# Patient Record
Sex: MALE | Race: WHITE | Employment: FULL TIME | URBAN - METROPOLITAN AREA
[De-identification: names, ages, dates, MRNs, and addresses within clinical notes are randomized per-mention and may not be internally consistent; named-entity substitution may affect disease eponyms.]

---

## 2017-05-03 ENCOUNTER — HOSPITAL ENCOUNTER (EMERGENCY)
Facility: CLINIC | Age: 47
Discharge: HOME | End: 2017-05-04
Attending: FAMILY MEDICINE | Admitting: FAMILY MEDICINE
Payer: COMMERCIAL

## 2017-05-03 ENCOUNTER — APPOINTMENT (OUTPATIENT)
Dept: GENERAL RADIOLOGY | Facility: CLINIC | Age: 47
End: 2017-05-03
Attending: FAMILY MEDICINE
Payer: COMMERCIAL

## 2017-05-03 DIAGNOSIS — R73.03 PREDIABETES: ICD-10-CM

## 2017-05-03 DIAGNOSIS — E78.5 HYPERLIPIDEMIA LDL GOAL <160: ICD-10-CM

## 2017-05-03 DIAGNOSIS — R07.9 ACUTE CHEST PAIN: ICD-10-CM

## 2017-05-03 DIAGNOSIS — R00.2 PALPITATIONS: ICD-10-CM

## 2017-05-03 LAB
ANION GAP SERPL CALCULATED.3IONS-SCNC: 11 MMOL/L (ref 3–14)
BASOPHILS # BLD AUTO: 0.1 10E9/L (ref 0–0.2)
BASOPHILS NFR BLD AUTO: 0.6 %
BUN SERPL-MCNC: 19 MG/DL (ref 7–30)
CALCIUM SERPL-MCNC: 9.2 MG/DL (ref 8.5–10.1)
CHLORIDE SERPL-SCNC: 104 MMOL/L (ref 94–109)
CO2 SERPL-SCNC: 29 MMOL/L (ref 20–32)
CREAT SERPL-MCNC: 0.96 MG/DL (ref 0.66–1.25)
D DIMER PPP FEU-MCNC: 0.3 UG/ML FEU (ref 0–0.5)
DIFFERENTIAL METHOD BLD: NORMAL
EOSINOPHIL # BLD AUTO: 0.5 10E9/L (ref 0–0.7)
EOSINOPHIL NFR BLD AUTO: 4.8 %
ERYTHROCYTE [DISTWIDTH] IN BLOOD BY AUTOMATED COUNT: 14.6 % (ref 10–15)
GFR SERPL CREATININE-BSD FRML MDRD: 84 ML/MIN/1.7M2
GLUCOSE SERPL-MCNC: 131 MG/DL (ref 70–99)
HBA1C MFR BLD: 5.4 % (ref 4.3–6)
HCT VFR BLD AUTO: 46 % (ref 40–53)
HGB BLD-MCNC: 14.9 G/DL (ref 13.3–17.7)
IMM GRANULOCYTES # BLD: 0 10E9/L (ref 0–0.4)
IMM GRANULOCYTES NFR BLD: 0.2 %
LYMPHOCYTES # BLD AUTO: 3.5 10E9/L (ref 0.8–5.3)
LYMPHOCYTES NFR BLD AUTO: 36.6 %
MCH RBC QN AUTO: 28.9 PG (ref 26.5–33)
MCHC RBC AUTO-ENTMCNC: 32.4 G/DL (ref 31.5–36.5)
MCV RBC AUTO: 89 FL (ref 78–100)
MONOCYTES # BLD AUTO: 0.6 10E9/L (ref 0–1.3)
MONOCYTES NFR BLD AUTO: 6.6 %
NEUTROPHILS # BLD AUTO: 5 10E9/L (ref 1.6–8.3)
NEUTROPHILS NFR BLD AUTO: 51.2 %
PLATELET # BLD AUTO: 234 10E9/L (ref 150–450)
POTASSIUM SERPL-SCNC: 4 MMOL/L (ref 3.4–5.3)
RBC # BLD AUTO: 5.16 10E12/L (ref 4.4–5.9)
SODIUM SERPL-SCNC: 144 MMOL/L (ref 133–144)
TROPONIN I SERPL-MCNC: NORMAL UG/L (ref 0–0.04)
WBC # BLD AUTO: 9.7 10E9/L (ref 4–11)

## 2017-05-03 PROCEDURE — 85379 FIBRIN DEGRADATION QUANT: CPT | Performed by: FAMILY MEDICINE

## 2017-05-03 PROCEDURE — 93005 ELECTROCARDIOGRAM TRACING: CPT | Mod: 76 | Performed by: FAMILY MEDICINE

## 2017-05-03 PROCEDURE — 93005 ELECTROCARDIOGRAM TRACING: CPT | Performed by: FAMILY MEDICINE

## 2017-05-03 PROCEDURE — 85025 COMPLETE CBC W/AUTO DIFF WBC: CPT | Performed by: FAMILY MEDICINE

## 2017-05-03 PROCEDURE — 93010 ELECTROCARDIOGRAM REPORT: CPT | Mod: Z6 | Performed by: FAMILY MEDICINE

## 2017-05-03 PROCEDURE — 99285 EMERGENCY DEPT VISIT HI MDM: CPT | Mod: 25 | Performed by: FAMILY MEDICINE

## 2017-05-03 PROCEDURE — 84484 ASSAY OF TROPONIN QUANT: CPT | Performed by: FAMILY MEDICINE

## 2017-05-03 PROCEDURE — 80048 BASIC METABOLIC PNL TOTAL CA: CPT | Performed by: FAMILY MEDICINE

## 2017-05-03 PROCEDURE — 25000132 ZZH RX MED GY IP 250 OP 250 PS 637: Performed by: FAMILY MEDICINE

## 2017-05-03 PROCEDURE — 71020 XR CHEST 2 VW: CPT | Mod: TC

## 2017-05-03 PROCEDURE — 93010 ELECTROCARDIOGRAM REPORT: CPT | Mod: 76 | Performed by: FAMILY MEDICINE

## 2017-05-03 PROCEDURE — 83036 HEMOGLOBIN GLYCOSYLATED A1C: CPT | Performed by: FAMILY MEDICINE

## 2017-05-03 RX ORDER — LIDOCAINE 40 MG/G
CREAM TOPICAL
Status: DISCONTINUED | OUTPATIENT
Start: 2017-05-03 | End: 2017-05-04 | Stop reason: HOSPADM

## 2017-05-03 RX ORDER — ASPIRIN 81 MG/1
324 TABLET, CHEWABLE ORAL ONCE
Status: COMPLETED | OUTPATIENT
Start: 2017-05-03 | End: 2017-05-03

## 2017-05-03 RX ADMIN — ASPIRIN 81 MG 324 MG: 81 TABLET ORAL at 22:34

## 2017-05-03 RX ADMIN — NITROGLYCERIN 15 MG: 20 OINTMENT TOPICAL at 22:35

## 2017-05-03 ASSESSMENT — ENCOUNTER SYMPTOMS
BLOOD IN STOOL: 0
WOUND: 0
FEVER: 0
MUSCULOSKELETAL NEGATIVE: 1
DIARRHEA: 0
VOMITING: 0
APPETITE CHANGE: 0
LIGHT-HEADEDNESS: 1
WHEEZING: 0
RECTAL PAIN: 0
MYALGIAS: 0
NUMBNESS: 0
CHILLS: 0
HEMATOLOGIC/LYMPHATIC NEGATIVE: 1
NAUSEA: 0
FATIGUE: 1
UNEXPECTED WEIGHT CHANGE: 0
ABDOMINAL PAIN: 0
COLOR CHANGE: 0
DIAPHORESIS: 1
COUGH: 0
SEIZURES: 0
NERVOUS/ANXIOUS: 1
CHEST TIGHTNESS: 1
BACK PAIN: 0
SHORTNESS OF BREATH: 0
EYES NEGATIVE: 1
ACTIVITY CHANGE: 1
GASTROINTESTINAL NEGATIVE: 1

## 2017-05-03 NOTE — ED AVS SNAPSHOT
[facility]    [address]     [place] [address]    Phone:  [phone]    Fax:  [phone]                                       Mell Lawson   MRN: [id number]    Department:  [facility]   Date of Visit:  5/3/2017           Patient Information     Date Of Birth          1970        Your diagnoses for this visit were:     Acute chest pain     Palpitations     Prediabetes     Hyperlipidemia LDL goal <160        You were seen by Eric Cuadra DO.      Follow-up Information     Follow up with Erica Toney, RN.    Specialty:  Family Practice    Contact information:    [facility]  [address]  [facility] [address]  [phone]          Follow up with [facility].    Specialty:  EMERGENCY MEDICINE    Why:  If symptoms worsen    Contact information:    [address]   [place] [address] [phone]    Additional information:    From [address]: Exit at [address] on south side of [place]. Turn right on [address]. Turn left at stoplight on [address]. [facility] will be in view two blocks ahead        Discharge Instructions       Please read and follow the handout(s) instructions. Return, if needed, for increased or worsening symptoms and as directed by the handout(s).    Please contact the cardiology stress testing department later today to schedule a cardiac evaluation as we discussed. Call [phone] to schedule.    Electronically signed, Eric Cuadra DO      Discharge References/Attachments     CHEST PAIN, UNCERTAIN CAUSE (ENGLISH)    CHEST PAIN, NONCARDIAC  (ENGLISH)    PALPITATIONS (ENGLISH)      24 Hour Appointment Hotline       To make an appointment at any [facility], call [phone] ([phone]). If you don't have a family doctor or clinic, we will help you find one. [place] clinics are conveniently located to serve the  needs of you and your family.          ED Discharge Orders     NM Exercise stress test       The type of stress to be performed (pharmacologic or physical) will be at the discretion of the supervising physician as per department protocol. Please send results to the patient's primary care physician                     Review of your medicines      Our records show that you are taking the medicines listed below. If these are incorrect, please call your family doctor or clinic.        Dose / Directions Last dose taken    EPINEPHrine 0.3 MG/0.3ML injection   Commonly known as:  EPIPEN 2-PATRIA   Dose:  0.3 mg   Quantity:  1 each        Inject 0.3 mLs (0.3 mg) into the muscle once as needed for anaphylaxis   Refills:  0          STOP taking        Dose Reason for stopping Comments    ADVIL PM PO              IBUPROFEN PO                      Procedures and tests performed during your visit     Procedure/Test Number of Times Performed    Basic metabolic panel 1    CBC with platelets differential 1    Cardiac Continuous Monitoring 1    D dimer quantitative 1    EKG 12 lead 1    EKG 12-lead, tracing only 1    Hemoglobin A1c 1    Peripheral IV: Standard 1    Pulse oximetry nursing 1    Troponin I 2    XR Chest 2 Views 1      Orders Needing Specimen Collection     None      Pending Results     No orders found for last 3 day(s).            Pending Culture Results     No orders found for last 3 day(s).            Pending Results Instructions     If you had any lab results that were not finalized at the time of your Discharge, you can call the ED Lab Result RN at [phone]. You will be contacted by this team for any positive Lab results or changes in treatment. The nurses are available 7 days a week from 10A to 6:30P.  You can leave a message 24 hours per day and they will return your call.        Thank you for choosing [facility]       Thank you for choosing [facility] for your care. Our goal is always to provide you with excellent  "care. Hearing back from our patients is one way we can continue to improve our services. Please take a few minutes to complete the written survey that you may receive in the mail after you visit with us. Thank you!        Recommend Information     Recommend lets you send messages to your doctor, view your test results, renew your prescriptions, schedule appointments and more. To sign up, go to [email or web address] . Click on \"Log in\" on the left side of the screen, which will take you to the Welcome page. Then click on \"Sign up Now\" on the right side of the page.     You will be asked to enter the access code listed below, as well as some personal information. Please follow the directions to create your username and password.     Your access code is: [email or web address]  Expires: 2017  1:32 AM     Your access code will  in 90 days. If you need help or a new code, please call your [facility] clinic or [phone].        Care EveryWhere ID     This is your Care EveryWhere ID. This could be used by other organizations to access your [facility] medical records  [id number]        After Visit Summary       This is your record. Keep this with you and show to your community pharmacist(s) and doctor(s) at your next visit.                  "

## 2017-05-03 NOTE — ED AVS SNAPSHOT
[facility] Emergency Department    [address] [place] [address]    Phone:  [phone]    Fax:  [phone]                                       Mell Lawson   MRN: [id number]    Department:  [facility] Emergency Department   Date of Visit:  5/3/2017           After Visit Summary Signature Page     I have received my discharge instructions, and my questions have been answered. I have discussed any challenges I see with this plan with the nurse or doctor.    ..........................................................................................................................................  Patient/Patient Representative Signature      ..........................................................................................................................................  Patient Representative Print Name and Relationship to Patient    ..................................................               ................................................  Date                                            Time    ..........................................................................................................................................  Reviewed by Signature/Title    ...................................................              ..............................................  Date                                                            Time

## 2017-05-04 VITALS
SYSTOLIC BLOOD PRESSURE: 136 MMHG | WEIGHT: 315 LBS | OXYGEN SATURATION: 96 % | DIASTOLIC BLOOD PRESSURE: 70 MMHG | TEMPERATURE: 97.8 F | RESPIRATION RATE: 18 BRPM | BODY MASS INDEX: 46.22 KG/M2

## 2017-05-04 LAB — TROPONIN I SERPL-MCNC: NORMAL UG/L (ref 0–0.04)

## 2017-05-04 PROCEDURE — 84484 ASSAY OF TROPONIN QUANT: CPT | Performed by: FAMILY MEDICINE

## 2017-05-04 NOTE — DISCHARGE INSTRUCTIONS
Please read and follow the handout(s) instructions. Return, if needed, for increased or worsening symptoms and as directed by the handout(s).    Please contact the cardiology stress testing department later today to schedule a cardiac evaluation as we discussed. Call [phone] to schedule.    Electronically signed, Eric Cuadra DO

## 2017-05-04 NOTE — ED PROVIDER NOTES
History     Chief Complaint   Patient presents with     Chest Pain     HPI  Mell Lawson is a 46 year old male who presents to the emergency room with his significant other secondary to concerns of chest pressure that started about one hour ago with some radiation into his left arm.  His significant other states that he's been dealing with these symptoms on and off for the last 1 year.  He was supposed to undergo a cardiac stress test about one year ago but had a reaction to the medication they used to inject prior to his stress test and he became afraid of returning for any additional testing after that reaction.  Patient states that he is on no medications and is otherwise healthy except for being a little overweight.  He states his blood pressure usually runs about 130/80.  He states that his family history is positive for diabetes.  He states he quit smoking 4 years ago.  He states that he doesn't have any pain at the moment but more just of a pressure to his anterior chest.  He no longer has any left arm pains.  He denies being short of breath but his significant other states that he's been increasingly winded with any activity over the last 1 year.    I have reviewed the Medications, Allergies, Past Medical and Surgical History, and Social History in the Epic system.  Patient Active Problem List   Diagnosis     Hyperlipidemia LDL goal <160     Elevated blood pressure reading without diagnosis of hypertension     Polyarthritis     Muscle pain     Back stiffness     Neck stiffness     Obesity     Prediabetes     ELIZABETH (obstructive sleep apnea)       Past Medical History:   Diagnosis Date     ELIZABETH (obstructive sleep apnea)         Past Surgical History:   Procedure Laterality Date     ARTHROSCOPY KNEE WITH MENISCAL REPAIR      Right knee, multiple other previous surgeries as well.     HERNIORRHAPHY UMBILICAL N/A 1/29/2015    Procedure: HERNIORRHAPHY UMBILICAL;  Surgeon: Maxime Boyce MD;  Location:  OR        Family History   Problem Relation Age of Onset     DIABETES Father      house first kidney transplant     Alzheimer Disease Maternal Grandmother      Arthritis Maternal Grandfather      DIABETES Paternal Grandfather      C.A.D. Paternal Grandfather        Social History     Social History     Marital status:      Spouse name: N/A     Number of children: N/A     Years of education: N/A     Occupational History     Not on file.     Social History Main Topics     Smoking status: Former Smoker     Packs/day: 1.00     Quit date: 5/28/2013     Smokeless tobacco: Never Used     Alcohol use Yes      Comment: occ     Drug use: No     Sexual activity: Yes     Partners: Female     Birth control/ protection: Surgical     Other Topics Concern     Parent/Sibling W/ Cabg, Mi Or Angioplasty Before 65f 55m? No     Social History Narrative       Current Outpatient Rx   Medication Sig Dispense Refill     EPINEPHrine (EPIPEN 2-PATRIA) 0.3 MG/0.3ML injection Inject 0.3 mLs (0.3 mg) into the muscle once as needed for anaphylaxis 1 each 0       Allergies   Allergen Reactions     Definity      Flushing      Seafood Swelling     Only to shell fish       Immunization History   Administered Date(s) Administered     Pneumococcal 23 valent 11/27/2007     TD (ADULT, 7+) 12/02/2014     Tdap (Adacel,Boostrix) 11/27/2007       Review of Systems   Constitutional: Positive for activity change, diaphoresis and fatigue. Negative for appetite change, chills, fever and unexpected weight change.   HENT: Negative.    Eyes: Negative.    Respiratory: Positive for chest tightness. Negative for cough, shortness of breath and wheezing.    Gastrointestinal: Negative.  Negative for abdominal pain, blood in stool, diarrhea, nausea, rectal pain and vomiting.   Genitourinary: Negative.    Musculoskeletal: Negative.  Negative for back pain and myalgias.   Skin: Negative for color change, rash and wound.   Neurological: Positive for light-headedness (patient  admits that he's had occasional episodes of feeling lightheaded over the last 1 year). Negative for seizures and numbness.   Hematological: Negative.    Psychiatric/Behavioral: The patient is nervous/anxious (his significant other states he is a history for anxiety at times.).    All other systems reviewed and are negative.      Physical Exam   BP: (!) 162/92  Heart Rate: 83  Temp: 97.8  F (36.6  C)  Resp: 18  Weight: (!) 163.3 kg (360 lb)  SpO2: 97 %  Physical Exam   Constitutional: He is oriented to person, place, and time. He appears well-developed and well-nourished. He appears distressed (Anxious).   Morbidly obese 46-year-old male who appears to be somewhat flushed and diaphoretic.   HENT:   Head: Normocephalic and atraumatic.   Right Ear: External ear normal.   Nose: Nose normal.   Mouth/Throat: Oropharynx is clear and moist.   Eyes: Conjunctivae and EOM are normal. Pupils are equal, round, and reactive to light.   Neck: Normal range of motion. Neck supple.   Patient has multiple skin tags surrounding his neck making me concerned for the possibility of insulin resistance as a causative reason for the skin tags.   Cardiovascular: Normal rate, normal heart sounds and intact distal pulses.  Exam reveals no gallop and no friction rub.    No murmur heard.  Pulmonary/Chest: Effort normal. No respiratory distress. He has no wheezes. He has no rales. He exhibits no tenderness.   Abdominal: Soft. There is no tenderness.   Musculoskeletal: Normal range of motion.   Neurological: He is alert and oriented to person, place, and time.   Skin: Skin is warm.   Patient has a flushed appearance to his face and anterior chest.   Psychiatric: His speech is normal and behavior is normal. Thought content normal. His mood appears anxious. Cognition and memory are normal.   Nursing note and vitals reviewed.      ED Course     ED Course     Procedures             EKG Interpretation:      Interpreted by Eric Cuadra  Time  reviewed: 22:08  Symptoms at time of EKG: Chest pressure   Rhythm: normal sinus   Rate: Normal  Axis: Normal  Ectopy: none  Conduction: normal  ST Segments/ T Waves: No ST-T wave changes and No acute ischemic changes  Q Waves: nonspecific  Comparison to prior: Unchanged from 2015    Clinical Impression: no acute changes         EKG Interpretation: #2     Interpreted by Eric Cuadra  Time reviewed:00:24   Symptoms at time of EKG: None   Comparison to prior: Unchanged from above    Clinical Impression: normal EKG      Critical Care time:  none            Results for orders placed or performed during the hospital encounter of 05/03/17 (from the past 48 hour(s))   CBC with platelets differential   Result Value Ref Range    WBC 9.7 4.0 - 11.0 10e9/L    RBC Count 5.16 4.4 - 5.9 10e12/L    Hemoglobin 14.9 13.3 - 17.7 g/dL    Hematocrit 46.0 40.0 - 53.0 %    MCV 89 78 - 100 fl    MCH 28.9 26.5 - 33.0 pg    MCHC 32.4 31.5 - 36.5 g/dL    RDW 14.6 10.0 - 15.0 %    Platelet Count 234 150 - 450 10e9/L    Diff Method Automated Method     % Neutrophils 51.2 %    % Lymphocytes 36.6 %    % Monocytes 6.6 %    % Eosinophils 4.8 %    % Basophils 0.6 %    % Immature Granulocytes 0.2 %    Absolute Neutrophil 5.0 1.6 - 8.3 10e9/L    Absolute Lymphocytes 3.5 0.8 - 5.3 10e9/L    Absolute Monocytes 0.6 0.0 - 1.3 10e9/L    Absolute Eosinophils 0.5 0.0 - 0.7 10e9/L    Absolute Basophils 0.1 0.0 - 0.2 10e9/L    Abs Immature Granulocytes 0.0 0 - 0.4 10e9/L   Basic metabolic panel   Result Value Ref Range    Sodium 144 133 - 144 mmol/L    Potassium 4.0 3.4 - 5.3 mmol/L    Chloride 104 94 - 109 mmol/L    Carbon Dioxide 29 20 - 32 mmol/L    Anion Gap 11 3 - 14 mmol/L    Glucose 131 (H) 70 - 99 mg/dL    Urea Nitrogen 19 7 - 30 mg/dL    Creatinine 0.96 0.66 - 1.25 mg/dL    GFR Estimate 84 >60 mL/min/1.7m2    GFR Estimate If Black >90   GFR Calc   >60 mL/min/1.7m2    Calcium 9.2 8.5 - 10.1 mg/dL   Troponin I   Result Value  Ref Range    Troponin I ES  0.000 - 0.045 ug/L     <0.015  The 99th percentile for upper reference range is 0.045 ug/L.  Troponin values in   the range of 0.045 - 0.120 ug/L may be associated with risks of adverse   clinical events.     Hemoglobin A1c   Result Value Ref Range    Hemoglobin A1C 5.4 4.3 - 6.0 %   D dimer quantitative   Result Value Ref Range    D Dimer 0.3 0.0 - 0.50 ug/ml FEU   XR Chest 2 Views    Narrative    XR CHEST 2 VW   5/3/2017 11:22 PM     INDICATION: Chest pain.    COMPARISON: 1/3/2013.      Impression    IMPRESSION: No infiltrates or other acute findings. Heart size is  within normal limits. Mild torsion aorta. No pneumothorax.    JAMAR DE DIOS MD   Troponin I   Result Value Ref Range    Troponin I ES  0.000 - 0.045 ug/L     <0.015  The 99th percentile for upper reference range is 0.045 ug/L.  Troponin values in   the range of 0.045 - 0.120 ug/L may be associated with risks of adverse   clinical events.       Medications ordered to be administered in the ER:    Medications   aspirin chewable tablet 324 mg (324 mg Oral Given 5/3/17 2234)   nitroglycerin (NITRO-BID) 2 % ointment 15 mg (15 mg Transdermal Given 5/3/17 2235)       Assessments & Plan (with Medical Decision Making)    HEART Score  Background  Calculates the overall risk of adverse event in patient's presenting with chest pain.  Based on 5 criteria (each assigned 0-2 points) including suspiciousness of history, EKG, age, risk factors and troponin.    Data  46 year old male  has Hyperlipidemia LDL goal <160; Elevated blood pressure reading without diagnosis of hypertension; Polyarthritis; Muscle pain; Back stiffness; Neck stiffness; Obesity; Prediabetes; and ELIZABETH (obstructive sleep apnea) on his problem list.   reports that he quit smoking about 3 years ago. He smoked 1.00 pack per day. He has never used smokeless tobacco.  family history includes Alzheimer Disease in his maternal grandmother; Arthritis in his maternal  grandfather; C.A.D. in his paternal grandfather; DIABETES in his father and paternal grandfather.  Lab Results   Component Value Date    TROPI  05/04/2017     <0.015  The 99th percentile for upper reference range is 0.045 ug/L.  Troponin values in   the range of 0.045 - 0.120 ug/L may be associated with risks of adverse   clinical events.       Criteria   0-2 points for each of 5 items (maximum of 10 points):  Score 1- History moderately suspicious for coronary syndrome  Score 0- EKG Normal  Score 1- Age 45 to 65 years old  Score 1- One to 2 risk factors for atherosclerotic disease  Score 0- Within normal limits for troponin levels  Interpretation  Risk of adverse outcome  Heart Score: 3  Total Score 0-3- Adverse Outcome Risk 2.5% - Supports early discharge with appropriate follow-up.      Procedural male to the emergency room with concerns of chest pain and pressure with radiation to left arm that started at approximately 9:00 this evening.  Patient with risk factors of obesity and prediabetes but states he has not had a history for hypertension and quit smoking years ago.  Exam findings as noted above.  Patient had repeat EKG and cardiac enzymes performed 3 hours after onset of his symptoms which again proved unremarkable for suggestion of ischemic heart disease or injury.  Patient was feeling improved and decision was made to discharge.  Patient did agree to really start his previous cardiac evaluation that was initiated in 2015 but stopped after he had the adverse reaction to the cardiac stress test.  If future order was placed for a cardiac nuclear study and asked that the study results be sent to his primary care provider..  Encouraged patient to make an appointment for follow-up with his primary care provider as well.  He was given both verbal and written instructions about chest pain and the signs and symptoms of concern and when to return to the ER should he have any symptoms.       I have reviewed the  nursing notes.    I have reviewed the findings, diagnosis, plan and need for follow up with the patient.    Final diagnoses:   Acute chest pain   Palpitations   Prediabetes   Hyperlipidemia LDL goal <160       5/3/2017   [facility] EMERGENCY DEPARTMENT     Eric Cuadra DO  05/04/17 2009

## 2017-05-04 NOTE — ED NOTES
LATE ENTRY DUE TO PT CARE:    Pt presented with chest pain into his left arm.  Pt placed on cardiac monitor, oxymetry, and blood pressure monitors.  IV placed and labs drawn.  Nitro past applied.  Pt progressed to no pain.  Wife at bedside.  At discharge pt denied any pain.

## 2020-10-20 ENCOUNTER — APPOINTMENT (OUTPATIENT)
Dept: GENERAL RADIOLOGY | Facility: CLINIC | Age: 50
End: 2020-10-20
Attending: FAMILY MEDICINE
Payer: COMMERCIAL

## 2020-10-20 ENCOUNTER — HOSPITAL ENCOUNTER (EMERGENCY)
Facility: CLINIC | Age: 50
Discharge: HOME | End: 2020-10-20
Attending: FAMILY MEDICINE | Admitting: FAMILY MEDICINE
Payer: COMMERCIAL

## 2020-10-20 VITALS
TEMPERATURE: 97.8 F | HEART RATE: 72 BPM | SYSTOLIC BLOOD PRESSURE: 156 MMHG | HEIGHT: 74 IN | BODY MASS INDEX: 40.43 KG/M2 | DIASTOLIC BLOOD PRESSURE: 99 MMHG | WEIGHT: 315 LBS | RESPIRATION RATE: 20 BRPM | OXYGEN SATURATION: 98 %

## 2020-10-20 DIAGNOSIS — R11.2 NAUSEA AND VOMITING, INTRACTABILITY OF VOMITING NOT SPECIFIED, UNSPECIFIED VOMITING TYPE: ICD-10-CM

## 2020-10-20 DIAGNOSIS — R68.83 CHILLS: ICD-10-CM

## 2020-10-20 LAB
ALBUMIN UR-MCNC: NEGATIVE MG/DL
APPEARANCE UR: CLEAR
BILIRUB UR QL STRIP: NEGATIVE
COLOR UR AUTO: YELLOW
GLUCOSE UR STRIP-MCNC: NEGATIVE MG/DL
HGB UR QL STRIP: NEGATIVE
KETONES UR STRIP-MCNC: NEGATIVE MG/DL
LEUKOCYTE ESTERASE UR QL STRIP: NEGATIVE
NITRATE UR QL: NEGATIVE
PH UR STRIP: 5 PH (ref 5–7)
SOURCE: NORMAL
SP GR UR STRIP: 1.02 (ref 1–1.03)
UROBILINOGEN UR STRIP-MCNC: 0 MG/DL (ref 0–2)

## 2020-10-20 PROCEDURE — 81003 URINALYSIS AUTO W/O SCOPE: CPT | Performed by: FAMILY MEDICINE

## 2020-10-20 PROCEDURE — 250N000011 HC RX IP 250 OP 636: Performed by: FAMILY MEDICINE

## 2020-10-20 PROCEDURE — 74019 RADEX ABDOMEN 2 VIEWS: CPT

## 2020-10-20 PROCEDURE — 99284 EMERGENCY DEPT VISIT MOD MDM: CPT | Performed by: FAMILY MEDICINE

## 2020-10-20 RX ORDER — LISINOPRIL 20 MG/1
20 TABLET ORAL DAILY
COMMUNITY
Start: 2020-08-28

## 2020-10-20 RX ORDER — ONDANSETRON 4 MG/1
4 TABLET, ORALLY DISINTEGRATING ORAL EVERY 8 HOURS PRN
Qty: 10 TABLET | Refills: 0 | Status: SHIPPED | OUTPATIENT
Start: 2020-10-20 | End: 2020-10-23

## 2020-10-20 RX ORDER — ALBUTEROL SULFATE 90 UG/1
1-2 AEROSOL, METERED RESPIRATORY (INHALATION)
COMMUNITY
Start: 2018-12-28

## 2020-10-20 RX ORDER — IBUPROFEN 200 MG
800 TABLET ORAL
COMMUNITY

## 2020-10-20 RX ORDER — METOPROLOL TARTRATE 25 MG/1
25 TABLET, FILM COATED ORAL DAILY
COMMUNITY
Start: 2020-08-28

## 2020-10-20 RX ORDER — DULOXETIN HYDROCHLORIDE 30 MG/1
60 CAPSULE, DELAYED RELEASE ORAL DAILY
COMMUNITY
Start: 2020-06-04

## 2020-10-20 RX ORDER — ONDANSETRON 4 MG/1
4 TABLET, ORALLY DISINTEGRATING ORAL ONCE
Status: COMPLETED | OUTPATIENT
Start: 2020-10-20 | End: 2020-10-20

## 2020-10-20 RX ADMIN — ONDANSETRON 4 MG: 4 TABLET, ORALLY DISINTEGRATING ORAL at 07:59

## 2020-10-20 ASSESSMENT — MIFFLIN-ST. JEOR: SCORE: 2503.75

## 2020-10-20 NOTE — ED PROVIDER NOTES
History     Chief Complaint   Patient presents with     Abdominal Pain     HPI  Mell Lawson is a 50 year old male who presents with abdominal pain that started in the middle of the night.  Patient then had 2 episodes of vomiting.  Patient got very diaphoretic during that period of time and got concerned.  His wife is worried that he might have Covid again as he was tested positive a month or 2 ago.  Patient denies any shortness of breath for lightheadedness or dizziness.  Patient denies any diarrhea.  Patient had a normal bowel movement last night.  Denies any recent fevers or chills.  Patient has had some back discomfort but has been going on for the last 3 weeks.  Patient states his abdominal pain is pretty much resolved at this time and is no longer nauseous.    Allergies:  Allergies   Allergen Reactions     Definity      Flushing      Seafood Swelling     Only to shell fish       Problem List:    Patient Active Problem List    Diagnosis Date Noted     ELIZABETH (obstructive sleep apnea) 12/16/2016     Priority: Medium     Prediabetes 02/08/2016     Priority: Medium     Obesity 01/28/2015     Priority: Medium     Hyperlipidemia LDL goal <160 06/08/2012     Priority: Medium     Elevated blood pressure reading without diagnosis of hypertension 06/08/2012     Priority: Medium     Polyarthritis 06/08/2012     Priority: Medium     Muscle pain 06/08/2012     Priority: Medium     (Problem list name updated by automated process. Provider to review and confirm.)       Back stiffness 06/08/2012     Priority: Medium     Neck stiffness 06/08/2012     Priority: Medium        Past Medical History:    Past Medical History:   Diagnosis Date     ELIZABETH (obstructive sleep apnea)        Past Surgical History:    Past Surgical History:   Procedure Laterality Date     ARTHROSCOPY KNEE WITH MENISCAL REPAIR      Right knee, multiple other previous surgeries as well.     HERNIORRHAPHY UMBILICAL N/A 1/29/2015    Procedure: HERNIORRHAPHY  "UMBILICAL;  Surgeon: Maxime Boyce MD;  Location: [facility] OR       Family History:    Family History   Problem Relation Age of Onset     Diabetes Father         house first kidney transplant     Alzheimer Disease Maternal Grandmother      Arthritis Maternal Grandfather      Diabetes Paternal Grandfather      DARREN.ANELLY. Paternal Grandfather        Social History:  Marital Status:   [2]  Social History     Tobacco Use     Smoking status: Former Smoker     Packs/day: 1.00     Quit date: 2013     Years since quittin.4     Smokeless tobacco: Never Used   Substance Use Topics     Alcohol use: Yes     Comment: occ     Drug use: No        Medications:         albuterol (PROAIR HFA/PROVENTIL HFA/VENTOLIN HFA) 108 (90 Base) MCG/ACT inhaler       DULoxetine (CYMBALTA) 30 MG capsule       EPINEPHrine (EPIPEN 2-PATRIA) 0.3 MG/0.3ML injection       lisinopril (ZESTRIL) 20 MG tablet       metoprolol tartrate (LOPRESSOR) 25 MG tablet       ibuprofen (ADVIL/MOTRIN) 200 MG tablet          Review of Systems   All other systems reviewed and are negative.      Physical Exam   BP: (!) 169/98  Pulse: 65  Temp: 97.8  F (36.6  C)  Resp: 20  Height: 188 cm (6' 2\")  Weight: (!) 157.4 kg (347 lb 0.1 oz)  SpO2: 98 %      Physical Exam  Vitals signs and nursing note reviewed.   Constitutional:       General: He is not in acute distress.     Appearance: He is well-developed. He is not diaphoretic.   HENT:      Head: Normocephalic and atraumatic.      Nose: Nose normal.      Mouth/Throat:      Pharynx: No oropharyngeal exudate.   Eyes:      General: No scleral icterus.     Conjunctiva/sclera: Conjunctivae normal.      Pupils: Pupils are equal, round, and reactive to light.   Neck:      Musculoskeletal: Normal range of motion.   Cardiovascular:      Rate and Rhythm: Normal rate and regular rhythm.      Heart sounds: Normal heart sounds. No murmur. No friction rub.   Pulmonary:      Effort: Pulmonary effort is normal. No respiratory distress. "      Breath sounds: Normal breath sounds. No wheezing or rales.   Abdominal:      General: Bowel sounds are normal. There is no distension.      Palpations: Abdomen is soft. There is no mass.      Tenderness: There is no abdominal tenderness. There is no guarding or rebound.   Musculoskeletal: Normal range of motion.         General: No tenderness.   Skin:     General: Skin is warm.      Findings: No rash.   Neurological:      Mental Status: He is alert and oriented to person, place, and time.   Psychiatric:         Judgment: Judgment normal.         ED Course        Procedures      Results for orders placed or performed during the hospital encounter of 10/20/20 (from the past 24 hour(s))   XR Abdomen 2 Views    Narrative    XR ABDOMEN 2 VW 10/20/2020 7:29 AM    HISTORY: vomiting, abd pain    COMPARISON: None.      Impression    IMPRESSION: Bowel gas pattern is nonobstructed. No free  intraperitoneal air. No abnormal mass or calcification.    PRETTY ROLAND MD   UA without Microscopic   Result Value Ref Range    Color Urine Yellow     Appearance Urine Clear     Glucose Urine Negative NEG^Negative mg/dL    Bilirubin Urine Negative NEG^Negative    Ketones Urine Negative NEG^Negative mg/dL    Specific Gravity Urine 1.025 1.003 - 1.035    Blood Urine Negative NEG^Negative    pH Urine 5.0 5.0 - 7.0 pH    Protein Albumin Urine Negative NEG^Negative mg/dL    Urobilinogen mg/dL 0.0 0.0 - 2.0 mg/dL    Nitrite Urine Negative NEG^Negative    Leukocyte Esterase Urine Negative NEG^Negative    Source Midstream Urine        Medications   ondansetron (ZOFRAN-ODT) ODT tab 4 mg (4 mg Oral Given 10/20/20 0759)       Urine was unremarkable and x-ray was normal.  Patient is feeling much better next he really wants to go home.  His biggest concern was did he have Covid again but patient has no signs or symptoms of this.  No fever, no shortness of breath, no cough or diarrhea.  At this point we will go ahead and discharge the patient  home.  He did request to get another Covid swab and so I will put this in for him.  Patient will follow-up with his doctor if symptoms do return.  I think his belly pain could be just some indigestion from what he ate last night.  Recommend that he do a trial of Zantac for a few days.    Assessments & Plan (with Medical Decision Making)  Nausea and vomiting     I have reviewed the nursing notes.    I have reviewed the findings, diagnosis, plan and need for follow up with the patient.              10/20/2020   [facility] EMERGENCY DEPT     Jean Desouza MD  10/20/20 6425

## 2020-10-20 NOTE — ED TRIAGE NOTES
Pt in with mid abd pain started last night, this morning sweaty with severe abd pain which has already decreased some now.

## 2021-02-26 ENCOUNTER — MYC MEDICAL ADVICE (OUTPATIENT)
Dept: ENDOCRINOLOGY | Facility: CLINIC | Age: 51
End: 2021-02-26

## 2021-02-26 NOTE — PROGRESS NOTES
"Mell Lawson is a 50 year old male who is being evaluated via a billable video visit.      The patient has been notified of following:     \"This video visit will be conducted via a call between you and your physician/provider. We have found that certain health care needs can be provided without the need for an in-person physical exam.  This service lets us provide the care you need with a video conversation.  If a prescription is necessary we can send it directly to your pharmacy.  If lab work is needed we can place an order for that and you can then stop by our lab to have the test done at a later time.    Video visits are billed at different rates depending on your insurance coverage.  Please reach out to your insurance provider with any questions.    If during the course of the call the physician/provider feels a video visit is not appropriate, you will not be charged for this service.\"    Patient has given verbal consent for Video visit? Yes  How would you like to obtain your AVS? MyChart  If you are dropped from the video visit, the video invite should be resent to: Text to cell phone: [phone]  Will anyone else be joining your video visit? No        Video-Visit Details    Type of service:  Video Visit    Video Start Time: 9:16 AM  Video End Time: 9:47 AM    Originating Location (pt. Location): Home    Distant Location (provider location):  [facility] [place]     Platform used for Video Visit: [employer]    During this virtual visit the patient is located in [place], patient verifies this as the location during the entirety of this visit.     New Weight Management Nutrition Consultation    Mell Lawson is a 50 year old male presents today for new weight management nutrition consultation.  Patient referred by Dr. Peñaloza on March 1, 2021.    Patient with Co-morbidities of obesity including:  Type II DM yes  Renal Failure no  Sleep apnea yes  Hypertension yes   Dyslipidemia " "yes  Joint pain yes  Back pain yes  GERD no     Needs to lose weight for a back surgery. Surgeon wants him to lose  lbs.      Anthropometrics:  Estimated body mass index is 49 kg/m  as calculated from the following:    Height as of an earlier encounter on 3/1/21: 1.905 m (6' 3\").    Weight as of an earlier encounter on 3/1/21: 177.8 kg (392 lb).    Medications for Weight Loss:  Ozempic starting today    NUTRITION HISTORY  See MD note for details.  Allergy to shellfish. Does not tolerate dairy (milk, ice cream).  Vit/min supplements: none  Previous wt loss attempts: feels like busy scheduled impacted healthy meals, WW (was successful somewhat, liked the accountability, counting the points).     Cravings: steak, pizza  Eating rapidly  Wife, Yani cooks dinner    Recent food recall:  Breakfast: tomato juice  Lunch: sandwich or salad; fast-food (hamburger and grilled ch sandwich + unsweet tea/pop;   Dinner: ~6 oz steak/beef/fish and veggies (broccoli, asparagus, salad) + occ pasta; hamburgers; roast; joe joe; cassarole   Snacks: not a lot; occ as meal instead - veggie sticks; after dinner - crackers occ if stomach upset    Beverages: unsweet tea. Occ coke. Trying to drink more water at work (does not prefer water).    Alcohol: 2-3x/week, +/-3-4 drinks per time (beer, whiskey/bouran/rum + 7 up/diet).   Dining out: Daily for lunch (fast-food), 2-3 times per week for dinner (fast-food, local sit-down).  2 nights per week - after school activity with daughter, relies on fast-food.      Physical Activity:  Limited by back and knee pain    Nutrition Prescription  Recommended energy/nutrient modification.  Volumetrics/Plate Method    Nutrition Diagnosis  Obesity r/t long history of self-monitoring deficit and excessive energy intake aeb BMI >30.    Nutrition Intervention  Materials/education provided on Volumetric eating to help satiety level on fewer calories; portion control and healthy food choices (Plate Method and " "Volumetrics handouts), meal and snack planning and websites, low sodium diet, heart healthy diet, and dietary management of DM2. Discussed importance of limiting fast-food. Pt notes he needs to work on increasing fluid intake. Discussed mindfulness and accountability using a food log/zoë. Patient demonstrates understanding. Provided pt with goals, handouts ands and recipe resources via [employer].     Expected Engagement: good  Follow-Up Plans: Meal planning     Nutrition Goals  1) Increase fluid intake, consume at least 64 oz/day (calorie-free beverages)   2) Plan meals ahead of time, especially for nights you know you are going to be busy.  3) Limit dining of lunch to 2 days per week.  4) Limit dining out at dinner to 3 times per week  5) Consider keeping a food journal, or keeping track of calories in an zoë like nGAP Pal or Lose It.    The Plate Method  [email or web address]    Protein Sources for Weight Loss  [email or web address]     Carbohydrates  [email or web address]     Summary of Volumetrics Eating Plan  [email or web address]     Lean and Heart Healthy Cooking Methods:  [email or web address]://[email or web address]    Tips for a Low Sodium Diet  http://[email or web address]    Diabetes Recipe Resources:  Https://www.diabetesfoodhub.org/  https://www.cdc.gov/diabetes/library/spotlights/hack-your-snack.html  https://www.eatright.org/food/nutrition/dietary-guidelines-and-myplate/how-to-add-whole-grains-to-your-diet  https://diabeticgourmet.com/diabetic-recipe/turkey-veggie-snacks    Healthy Recipe Resources:  \"The Volumetrics Eating Plan\" by Nayla Shaver, Ph.D.  https://www.Nippo.FamilySpace.RU/  www.WireOver.FamilySpace.RU  www.oldProject Repat.org  Dash Diet Recipes [facility]  www.extension.[facility] - the recipe box  \"Cooking that Counts\" by editors of " CookingLight  https://www.[facility]/communityculinary/[facility]_Cookbook_KT_Final_11-6_NoCrops-compressed.pdf  Https://www.choosemyplate.gov/myplatekitchen  https://snaped.fns.usda.gov/recipes-menus - SNAP recipes      Follow-Up:  1 month, or PRN      Ivy Cain RD, LD

## 2021-03-01 ENCOUNTER — VIRTUAL VISIT (OUTPATIENT)
Dept: ENDOCRINOLOGY | Facility: CLINIC | Age: 51
End: 2021-03-01
Payer: COMMERCIAL

## 2021-03-01 VITALS — HEIGHT: 75 IN | BODY MASS INDEX: 39.17 KG/M2 | WEIGHT: 315 LBS

## 2021-03-01 DIAGNOSIS — Z71.3 NUTRITIONAL COUNSELING: Primary | ICD-10-CM

## 2021-03-01 DIAGNOSIS — E11.9 TYPE 2 DIABETES MELLITUS WITHOUT COMPLICATION, WITHOUT LONG-TERM CURRENT USE OF INSULIN (H): ICD-10-CM

## 2021-03-01 DIAGNOSIS — E66.9 OBESITY: ICD-10-CM

## 2021-03-01 DIAGNOSIS — E11.9 TYPE 2 DIABETES MELLITUS WITHOUT COMPLICATION, WITHOUT LONG-TERM CURRENT USE OF INSULIN (H): Primary | ICD-10-CM

## 2021-03-01 PROBLEM — M51.26 HERNIATED LUMBAR INTERVERTEBRAL DISC: Status: ACTIVE | Noted: 2018-05-08

## 2021-03-01 PROBLEM — G43.909 MIGRAINE HEADACHE: Status: ACTIVE | Noted: 2020-06-04

## 2021-03-01 PROBLEM — M54.59 MECHANICAL LOW BACK PAIN: Status: ACTIVE | Noted: 2018-05-08

## 2021-03-01 PROBLEM — M54.16 ACUTE LEFT LUMBAR RADICULOPATHY: Status: ACTIVE | Noted: 2018-04-29

## 2021-03-01 PROBLEM — M79.662 PAIN OF LEFT LOWER LEG: Status: ACTIVE | Noted: 2021-01-14

## 2021-03-01 PROBLEM — M17.9 OSTEOARTHRITIS OF KNEE: Status: ACTIVE | Noted: 2020-06-04

## 2021-03-01 PROBLEM — F41.9 ANXIETY: Status: ACTIVE | Noted: 2018-01-30

## 2021-03-01 PROBLEM — J45.30 MILD PERSISTENT ASTHMA WITHOUT COMPLICATION: Status: ACTIVE | Noted: 2020-07-09

## 2021-03-01 PROBLEM — E11.69 DIABETES MELLITUS TYPE 2 IN OBESE: Status: ACTIVE | Noted: 2017-08-24

## 2021-03-01 PROBLEM — D17.79 EPIDURAL LIPOMATOSIS: Status: ACTIVE | Noted: 2020-10-01

## 2021-03-01 PROBLEM — M17.12 ARTHRITIS OF LEFT KNEE: Status: ACTIVE | Noted: 2020-07-15

## 2021-03-01 PROBLEM — M54.17 LUMBOSACRAL RADICULITIS: Status: ACTIVE | Noted: 2018-05-08

## 2021-03-01 PROBLEM — J44.9 CHRONIC OBSTRUCTIVE PULMONARY DISEASE (H): Status: ACTIVE | Noted: 2020-06-04

## 2021-03-01 PROCEDURE — 99204 OFFICE O/P NEW MOD 45 MIN: CPT | Mod: 95 | Performed by: INTERNAL MEDICINE

## 2021-03-01 PROCEDURE — 97802 MEDICAL NUTRITION INDIV IN: CPT | Mod: GT | Performed by: DIETITIAN, REGISTERED

## 2021-03-01 RX ORDER — METFORMIN HCL 500 MG
TABLET, EXTENDED RELEASE 24 HR ORAL
COMMUNITY
Start: 2020-11-04

## 2021-03-01 RX ORDER — PEN NEEDLE, DIABETIC 31 GX5/16"
NEEDLE, DISPOSABLE MISCELLANEOUS
Qty: 30 EACH | Refills: 3 | Status: SHIPPED | OUTPATIENT
Start: 2021-03-01

## 2021-03-01 ASSESSMENT — PAIN SCALES - GENERAL: PAINLEVEL: NO PAIN (0)

## 2021-03-01 ASSESSMENT — MIFFLIN-ST. JEOR: SCORE: 2723.73

## 2021-03-01 NOTE — PROGRESS NOTES
"    New Medical Weight Management Consult    PATIENT:  Mell Lawson  MRN:         [id number]  :         1970  PIETER:         3/1/2021    Dear Erica Toney RN,    I had the pleasure of seeing your patient, Mell Lawson.  Full intake/assessment done to determine barriers to weight loss success and develop a treatment plan.  Mell Lawson is a 50 year old male interested in treatment of medical problems associated with weight.  His weight today is 392 lbs 0 oz, Body mass index is 49 kg/m ., and he has the following co-morbidities:     2021   I have the following health issues associated with obesity: Type II Diabetes, Heart Disease, High Blood Pressure, High Cholesterol, Sleep Apnea, Asthma, Osteoarthritis (joint disease)   I have the following symptoms associated with obesity: Knee Pain, Lower Extremity Swelling, Back Pain     Patient Goals 2021   I am interested in having a healthier weight to diminish current health problems: Yes   I am interested in having a healthier weight in order to prevent future health problems: Yes   I am interested in having a healthier weight in order to have a future surgery: Yes   If yes, please indicate which surgery? To take pressure off spine     Referring Provider 2021   Please name the provider who referred you to Medical Weight Management.  If you do not know, please answer: \"I Don't Know\". I don t know     Wt Readings from Last 4 Encounters:   21 (!) 177.8 kg (392 lb)   10/20/20 (!) 157.4 kg (347 lb 0.1 oz)   17 (!) 163.3 kg (360 lb)   16 83.7 kg (184 lb 9.6 oz)     Weight History 2021   How concerned are you about your weight? Very Concerned   Would you describe your weight gain as gradual? Yes   I became overweight: As an Adult   The following factors have contributed to my weight gain:  Change in Schedule, After Quitting Smoking, Lack of Exercise, Stress   My lowest weight since age 18 was: 260   My highest weight since age " 18 was: 410   The most weight I have ever lost was: (lbs) 40   I have the following family history of obesity/being overweight:  My mother is overweight, Many of my relatives are overweight   Has anyone in your family had weight loss surgery? No   How has your weight changed over the last year?  Gained   How many pounds? 15       Diet Recall 2/28/2021   Glass juice/day 0   Glass milk/day 0   Glass sugary drink/day 0   How many cans/bottles of sugar pop/soda/tea/sports drinks do you drink in a day? 0   Diet drink/day 8   Alcohol 2-3 TImes a Week   Drinks/day 3-4       Eating Habits 2/28/2021   Generally, my meals include foods like these: bread, pasta, rice, potatoes, corn, crackers, sweet dessert, pop, or juice. A Few Times a Week   Generally, my meals include foods like these: fried meats, brats, burgers, french fries, pizza, cheese, chips, or ice cream. A Few Times a Week   Eat fast food (like [employer], Sure Chill, Taco Bell). A Few Times a Week   Eat at a buffet or sit-down restaurant. Once a Week   Eat most of my meals in front of the TV or computer. Never   Often skip meals, eat at random times, have no regular eating times. Less Than Weekly   Rarely sit down for a meal but snack or graze throughout.  Never   Eat extra snacks between meals. Less Than Weekly   Eat most of my food at the end of the day. Less Than Weekly   Eat in the middle of the night or wake up at night to eat. Never   Eat extra snacks to prevent or correct low blood sugar. Never   Eat to prevent acid reflux or stomach pain. Never   Worry about not having enough food to eat. Never   Have you been to the food shelf at least a few times this year? No   I eat when I am depressed. Never   I eat when I am stressed. Never   I eat when I am bored. Less Than Weekly   I eat when I am anxious. Less Than Weekly   I eat when I am happy or as a reward. Never   I feel hungry all the time even if I just have eaten. Less Than Weekly   Feeling full is  important to me. A Few Times a Week   I finish all the food on my plate even if I am already full. A Few Times a Week   I can't resist eating delicious food or walk past the good food/smell. Once a Week   I eat/snack without noticing that I am eating. Never   I eat when I am preparing the meal. Less Than Weekly   I eat more than usual when I see others eating. A Few Times a Week   I have trouble not eating sweets, ice cream, cookies, or chips if they are around the house. Never   I think about food all day. Never   What foods, if any, do you crave? None   Please list any other foods you crave? Steak, pizza       Activity/Exercise History 2/28/2021   How much of a typical 12 hour day do you spend sitting? Most of the Day   How much of a typical 12 hour day do you spend lying down? Less Than Half the Day   How much of a typical day do you spend walking/standing? Less Than Half the Day   How many hours (not including work) do you spend on the TV/Video Games/Computer/Tablet/Phone? 6 Hours or More   How many times a week are you active for the purpose of exercise? Never   What keeps you from being more active? Pain, Lack of Time, Too tired   How many total minutes do you spend doing some activity for the purpose of exercising when you exercise? None       PAST MEDICAL HISTORY:  Past Medical History:   Diagnosis Date     Diabetes mellitus type 2 in obese (H) 8/24/2017     ELIZABETH (obstructive sleep apnea)      Osteoarthritis of knee 6/4/2020       Work/Social History Reviewed With Patient 2/28/2021   My employment status is: Full-Time   My job is:    How much of your job is spent on the computer or phone? 75%   How many hours do you spend commuting to work daily?  20 min   What is your marital status? /In a Relationship   If in a relationship, is your significant other overweight? Yes   Do you have children? Yes   If you have children, are they overweight? No   Who do you live with?  Wife, 2 kids   Are  they supportive of your health goals? Yes   Who does the food shopping?  Wife       Mental Health History Reviewed With Patient 2/28/2021   Have you ever been physically or sexually abused? No   If yes, do you feel that the abuse is affecting your weight? N/A   If yes, would you like to talk to a counselor about the abuse? N/A   How often in the past 2 weeks have you felt little interest or pleasure in doing things? Nearly Everyday   Over the past 2 weeks how often have you felt down, depressed, or hopeless? For Several Days       Sleep History Reviewed With Patient 2/28/2021   How many hours do you sleep at night? 7   Do you think that you snore loudly or has anybody ever heard you snore loudly (louder than talking or so loud it can be heard behind a shut door)? No   Has anyone seen or heard you stop breathing during your sleep? Yes   Do you often feel tired, fatigued, or sleepy during the day? Yes   Do you have a TV/Computer in your bedroom? No       MEDICATIONS:   Current Outpatient Medications   Medication Sig Dispense Refill     DULoxetine (CYMBALTA) 30 MG capsule Take 60 mg by mouth daily       EPINEPHrine (EPIPEN 2-PATRIA) 0.3 MG/0.3ML injection Inject 0.3 mLs (0.3 mg) into the muscle once as needed for anaphylaxis 1 each 0     lisinopril (ZESTRIL) 20 MG tablet Take 20 mg by mouth daily       metFORMIN (GLUCOPHAGE-XR) 500 MG 24 hr tablet metformin  mg tablet,extended release 24 hr   TAKE 1 TABLET BY MOUTH TWO TIMES A DAY WITH MEALS.       metoprolol tartrate (LOPRESSOR) 25 MG tablet Take 25 mg by mouth daily       albuterol (PROAIR HFA/PROVENTIL HFA/VENTOLIN HFA) 108 (90 Base) MCG/ACT inhaler Inhale 1-2 puffs into the lungs       ibuprofen (ADVIL/MOTRIN) 200 MG tablet Take 800 mg by mouth         ALLERGIES:   Allergies   Allergen Reactions     Shellfish Allergy Nausea and Vomiting     Other reaction(s): Wheezing     Definity      Flushing      Propane Unknown     Flushing      Seafood Swelling     Only to  "shell fish       PHYSICAL EXAM:  Ht 1.905 m (6' 3\")   Wt (!) 177.8 kg (392 lb)   BMI 49.00 kg/m     A & O x 3  HEENT: NCAT, mucous membranes moist  Respirations unlabored  Location of obesity: Mixed Obesity    ASSESSMENT:  Mell is a patient with mature onset morbid obesity with significant element of familial/genetic influence and with current health consequences. He does need aggressive weight loss plan due to Type II Diabetes (dx 11/20), Heart Disease, High Blood Pressure, High Cholesterol, Sleep Apnea, Asthma, Osteoarthritis (s/p bilateral knee replacement).      Mell Lawson eats a high carb diet.    His problem is complicated by a hunger disorder    His ability to lose weight is impacted by lack of confidence.    PLAN:    Volumetrics eating plan  Meal planning    Diabetes   Ancillary testing:  N/A. Frequent home glucose monitoring with report to nurse as normal weight decreases.   Food Plan:  Low carbohydrate.  Activity Plan:  Activity journal.  Supplementary:  N/A.  Medication:  Start: Semaglutide. The patient will begin medication in pursuit of improved medical status as influenced by body weight. He will start semaglutide.  There is a mutual understanding of the goals and risks of this therapy. The patient is in agreement. He is educated on dosage regimen and possible side effects.    RTC:    12 weeks.  Video call duration: 30 minutes.  I explained the conditions and plans (more than 50% of time was counseling/coordination of weight management).    Sincerely,  David Peñaloza MD    The patient was evaluated via a billable video visit and notified \"This visit will be via video call between you and your physician. If lab work is needed we can place an order and you can later stop by the lab. Video visits are billed at different rates depending on your insurance coverage.  Please reach out to your insurance provider with any questions. If the physician feels a video visit is not appropriate, " "you will not be charged for this service.\" Patient gave verbal consent for Video visit and would you like to obtain AVS by [employer].      "

## 2021-03-01 NOTE — LETTER
"3/1/2021       RE: Mell Lawson  [address]  [facility] [address]     Dear Colleague,    Thank you for referring your patient, Mell Lawson, to the [facility] [place] at [facility]. Please see a copy of my visit note below.    Mell Lawson is a 50 year old male who is being evaluated via a billable video visit.      The patient has been notified of following:     \"This video visit will be conducted via a call between you and your physician/provider. We have found that certain health care needs can be provided without the need for an in-person physical exam.  This service lets us provide the care you need with a video conversation.  If a prescription is necessary we can send it directly to your pharmacy.  If lab work is needed we can place an order for that and you can then stop by our lab to have the test done at a later time.    Video visits are billed at different rates depending on your insurance coverage.  Please reach out to your insurance provider with any questions.    If during the course of the call the physician/provider feels a video visit is not appropriate, you will not be charged for this service.\"    Patient has given verbal consent for Video visit? Yes  How would you like to obtain your AVS? MyChart  If you are dropped from the video visit, the video invite should be resent to: Text to cell phone: [phone]  Will anyone else be joining your video visit? No        Video-Visit Details    Type of service:  Video Visit    Video Start Time: 9:16 AM  Video End Time: 9:47 AM    Originating Location (pt. Location): Home    Distant Location (provider location):  [facility] [place]     Platform used for Video Visit: [employer]    During this virtual visit the patient is located in [place], patient verifies this as the location during the entirety of this visit.     New Weight " "Management Nutrition Consultation    Mell Lawson is a 50 year old male presents today for new weight management nutrition consultation.  Patient referred by Dr. Peñaloza on March 1, 2021.    Patient with Co-morbidities of obesity including:  Type II DM yes  Renal Failure no  Sleep apnea yes  Hypertension yes   Dyslipidemia yes  Joint pain yes  Back pain yes  GERD no     Needs to lose weight for a back surgery. Surgeon wants him to lose  lbs.      Anthropometrics:  Estimated body mass index is 49 kg/m  as calculated from the following:    Height as of an earlier encounter on 3/1/21: 1.905 m (6' 3\").    Weight as of an earlier encounter on 3/1/21: 177.8 kg (392 lb).    Medications for Weight Loss:  Ozempic starting today    NUTRITION HISTORY  See MD note for details.  Allergy to shellfish. Does not tolerate dairy (milk, ice cream).  Vit/min supplements: none  Previous wt loss attempts: feels like busy scheduled impacted healthy meals, WW (was successful somewhat, liked the accountability, counting the points).     Cravings: steak, pizza  Eating rapidly  Wife, Yani cooks dinner    Recent food recall:  Breakfast: tomato juice  Lunch: sandwich or salad; fast-food (hamburger and grilled ch sandwich + unsweet tea/pop;   Dinner: ~6 oz steak/beef/fish and veggies (broccoli, asparagus, salad) + occ pasta; hamburgers; roast; joe joe; cassarole   Snacks: not a lot; occ as meal instead - veggie sticks; after dinner - crackers occ if stomach upset    Beverages: unsweet tea. Occ coke. Trying to drink more water at work (does not prefer water).    Alcohol: 2-3x/week, +/-3-4 drinks per time (beer, whiskey/bouran/rum + 7 up/diet).   Dining out: Daily for lunch (fast-food), 2-3 times per week for dinner (fast-food, local sit-down).  2 nights per week - after school activity with daughter, relies on fast-food.      Physical Activity:  Limited by back and knee pain    Nutrition Prescription  Recommended energy/nutrient " "modification.  Volumetrics/Plate Method    Nutrition Diagnosis  Obesity r/t long history of self-monitoring deficit and excessive energy intake aeb BMI >30.    Nutrition Intervention  Materials/education provided on Volumetric eating to help satiety level on fewer calories; portion control and healthy food choices (Plate Method and Volumetrics handouts), meal and snack planning and websites, low sodium diet, heart healthy diet, and dietary management of DM2. Discussed importance of limiting fast-food. Pt notes he needs to work on increasing fluid intake. Discussed mindfulness and accountability using a food log/zoë. Patient demonstrates understanding. Provided pt with goals, handouts ands and recipe resources via [employer].     Expected Engagement: good  Follow-Up Plans: Meal planning     Nutrition Goals  1) Increase fluid intake, consume at least 64 oz/day (calorie-free beverages)   2) Plan meals ahead of time, especially for nights you know you are going to be busy.  3) Limit dining of lunch to 2 days per week.  4) Limit dining out at dinner to 3 times per week  5) Consider keeping a food journal, or keeping track of calories in an zoë like [employer] or Lose It.    The Plate Method  [email or web address]    Protein Sources for Weight Loss  [email or web address]     Carbohydrates  [email or web address]     Summary of Volumetrics Eating Plan  [email or web address]     Lean and Heart Healthy Cooking Methods:  [email or web address]    Tips for a Low Sodium Diet  http://www.[email or web address]    Diabetes Recipe Resources:  Https://www.diabetesfoodhub.org/  https://www.cdc.gov/diabetes/library/spotlights/hack-your-snack.html  https://www.eatright.org/food/nutrition/dietary-guidelines-and-myplate/how-to-add-whole-grains-to-your-diet  https://diabeticgourmet.com/diabetic-recipe/turkey-veggie-snacks    Healthy Recipe Resources:  \"The Volumetrics Eating Plan\" by Nayla" "Sivakumar, Ph.D.  https://www.[employer].Nativo/  www.[employer].Nativo  www.[employer].org  Dash Diet Recipes [facility]  www.extension.[facility] - the recipe box  \"Cooking that Counts\" by editors of CookingLight  https://www.[facility]/communityculinary/[facility]_Cookbook_KT_Final_11-6_NoCrops-compressed.pdf  Https://www.choosemyplate.gov/myplatekitchen  https://snaped.fns.usda.gov/recipes-menus - SNAP recipes      Follow-Up:  1 month, or PRN      Ivy Cain RD, LD          "

## 2021-03-01 NOTE — NURSING NOTE
"(   Chief Complaint   Patient presents with     Weight Problem     new [facility]    )    ( Weight: (!) 177.8 kg (392 lb)(per pt) )  ( Height: 190.5 cm (6' 3\")(per pt) )  ( BMI (Calculated): 49 )  (   )  ( Cumulative weight loss (lbs): 0 )  (   )  (   )  (   )  (   )    (   )  (   )  (   )  (   )  (   )  (   )  (   )    (   Patient Active Problem List   Diagnosis     Hyperlipidemia LDL goal <160     Elevated blood pressure reading without diagnosis of hypertension     Polyarthritis     Muscle pain     Back stiffness     Neck stiffness     Morbid obesity (H)     Prediabetes     Obstructive sleep apnea syndrome     Acute left lumbar radiculopathy     Lumbosacral radiculitis     Anxiety     Arthritis of knee, right     Arthritis of left knee     Chronic obstructive pulmonary disease (H)     Diabetes mellitus type 2 in obese (H)     Epidural lipomatosis     Mechanical low back pain     Herniated lumbar intervertebral disc     Family history of diabetes mellitus     Migraine headache     Mild persistent asthma without complication     Osteoarthritis of knee     Pain of left lower leg     Primary osteoarthritis of both knees    )  (   Current Outpatient Medications   Medication Sig Dispense Refill     DULoxetine (CYMBALTA) 30 MG capsule Take 60 mg by mouth daily       EPINEPHrine (EPIPEN 2-PATRIA) 0.3 MG/0.3ML injection Inject 0.3 mLs (0.3 mg) into the muscle once as needed for anaphylaxis 1 each 0     lisinopril (ZESTRIL) 20 MG tablet Take 20 mg by mouth daily       metFORMIN (GLUCOPHAGE-XR) 500 MG 24 hr tablet metformin  mg tablet,extended release 24 hr   TAKE 1 TABLET BY MOUTH TWO TIMES A DAY WITH MEALS.       metoprolol tartrate (LOPRESSOR) 25 MG tablet Take 25 mg by mouth daily       albuterol (PROAIR HFA/PROVENTIL HFA/VENTOLIN HFA) 108 (90 Base) MCG/ACT inhaler Inhale 1-2 puffs into the lungs       ibuprofen (ADVIL/MOTRIN) 200 MG tablet Take 800 mg by mouth      )  ( Diabetes Eval:    )    ( Pain Eval:  No Pain " (0) )    ( Wound Eval:       )    (   History   Smoking Status     Former Smoker     Packs/day: 1.00     Quit date: 5/28/2013   Smokeless Tobacco     Never Used    )    ( Signed By:  Jesus Whittington, EMT; March 1, 2021; 8:22 AM )

## 2021-03-01 NOTE — PROGRESS NOTES
"Mell Lawson is a 50 year old male who is being evaluated via a billable video visit.      The patient has been notified of following:     \"This video visit will be conducted via a call between you and your physician/provider. We have found that certain health care needs can be provided without the need for an in-person physical exam.  This service lets us provide the care you need with a video conversation.  If a prescription is necessary we can send it directly to your pharmacy.  If lab work is needed we can place an order for that and you can then stop by our lab to have the test done at a later time.    If during the course of the call the physician/provider feels a video visit is not appropriate, you will not be charged for this service.\"     Patient confirmed that they are in [place] for today's visit    If the video visit is dropped, please send link to:   Text to cell phone: [phone]    Video-Visit Details  Type of service:  Video Visit    Video call duration: 30 minutes.  I explained the conditions and plans (more than 50% of time was counseling/coordination of weight management).    Originating Location (pt. Location): Home    Distant Location (provider location):  [facility] [place]     Platform used: Matthew            "

## 2021-03-01 NOTE — LETTER
"3/1/2021       RE: Mell Lawson  [address] [facility] [address]     Dear Colleague,    Thank you for referring your patient, Mell Lawson, to the [facility] [place] at [facility]. Please see a copy of my visit note below.    Mell Lawson is a 50 year old male who is being evaluated via a billable video visit.      The patient has been notified of following:     \"This video visit will be conducted via a call between you and your physician/provider. We have found that certain health care needs can be provided without the need for an in-person physical exam.  This service lets us provide the care you need with a video conversation.  If a prescription is necessary we can send it directly to your pharmacy.  If lab work is needed we can place an order for that and you can then stop by our lab to have the test done at a later time.    If during the course of the call the physician/provider feels a video visit is not appropriate, you will not be charged for this service.\"     Patient confirmed that they are in [place] for today's visit    If the video visit is dropped, please send link to:   Text to cell phone: [phone]    Video-Visit Details  Type of service:  Video Visit    Video call duration: 30 minutes.  I explained the conditions and plans (more than 50% of time was counseling/coordination of weight management).    Originating Location (pt. Location): Home    Distant Location (provider location):  [facility] [place]     Platform used: [facility] Weight Management Consult    PATIENT:  Mell Lawson  MRN:         [id number]  :         1970  PIETER:         3/1/2021    Dear Erica Toney, GERRI,    I had the pleasure of seeing your patient, Mell Lawson.  Full intake/assessment done to determine barriers to weight loss success and develop a " "treatment plan.  Mell Lawson is a 50 year old male interested in treatment of medical problems associated with weight.  His weight today is 392 lbs 0 oz, Body mass index is 49 kg/m ., and he has the following co-morbidities:     2/28/2021   I have the following health issues associated with obesity: Type II Diabetes, Heart Disease, High Blood Pressure, High Cholesterol, Sleep Apnea, Asthma, Osteoarthritis (joint disease)   I have the following symptoms associated with obesity: Knee Pain, Lower Extremity Swelling, Back Pain     Patient Goals 2/28/2021   I am interested in having a healthier weight to diminish current health problems: Yes   I am interested in having a healthier weight in order to prevent future health problems: Yes   I am interested in having a healthier weight in order to have a future surgery: Yes   If yes, please indicate which surgery? To take pressure off spine     Referring Provider 2/28/2021   Please name the provider who referred you to Medical Weight Management.  If you do not know, please answer: \"I Don't Know\". I don t know     Wt Readings from Last 4 Encounters:   03/01/21 (!) 177.8 kg (392 lb)   10/20/20 (!) 157.4 kg (347 lb 0.1 oz)   05/03/17 (!) 163.3 kg (360 lb)   02/08/16 83.7 kg (184 lb 9.6 oz)     Weight History 2/28/2021   How concerned are you about your weight? Very Concerned   Would you describe your weight gain as gradual? Yes   I became overweight: As an Adult   The following factors have contributed to my weight gain:  Change in Schedule, After Quitting Smoking, Lack of Exercise, Stress   My lowest weight since age 18 was: 260   My highest weight since age 18 was: 410   The most weight I have ever lost was: (lbs) 40   I have the following family history of obesity/being overweight:  My mother is overweight, Many of my relatives are overweight   Has anyone in your family had weight loss surgery? No   How has your weight changed over the last year?  Gained   How many " pounds? 15       Diet Recall 2/28/2021   Glass juice/day 0   Glass milk/day 0   Glass sugary drink/day 0   How many cans/bottles of sugar pop/soda/tea/sports drinks do you drink in a day? 0   Diet drink/day 8   Alcohol 2-3 TImes a Week   Drinks/day 3-4       Eating Habits 2/28/2021   Generally, my meals include foods like these: bread, pasta, rice, potatoes, corn, crackers, sweet dessert, pop, or juice. A Few Times a Week   Generally, my meals include foods like these: fried meats, brats, burgers, french fries, pizza, cheese, chips, or ice cream. A Few Times a Week   Eat fast food (like McDonalds, [employer], Taco Bell). A Few Times a Week   Eat at a buffet or sit-down restaurant. Once a Week   Eat most of my meals in front of the TV or computer. Never   Often skip meals, eat at random times, have no regular eating times. Less Than Weekly   Rarely sit down for a meal but snack or graze throughout.  Never   Eat extra snacks between meals. Less Than Weekly   Eat most of my food at the end of the day. Less Than Weekly   Eat in the middle of the night or wake up at night to eat. Never   Eat extra snacks to prevent or correct low blood sugar. Never   Eat to prevent acid reflux or stomach pain. Never   Worry about not having enough food to eat. Never   Have you been to the food shelf at least a few times this year? No   I eat when I am depressed. Never   I eat when I am stressed. Never   I eat when I am bored. Less Than Weekly   I eat when I am anxious. Less Than Weekly   I eat when I am happy or as a reward. Never   I feel hungry all the time even if I just have eaten. Less Than Weekly   Feeling full is important to me. A Few Times a Week   I finish all the food on my plate even if I am already full. A Few Times a Week   I can't resist eating delicious food or walk past the good food/smell. Once a Week   I eat/snack without noticing that I am eating. Never   I eat when I am preparing the meal. Less Than Weekly   I eat  more than usual when I see others eating. A Few Times a Week   I have trouble not eating sweets, ice cream, cookies, or chips if they are around the house. Never   I think about food all day. Never   What foods, if any, do you crave? None   Please list any other foods you crave? Steak, pizza       Activity/Exercise History 2/28/2021   How much of a typical 12 hour day do you spend sitting? Most of the Day   How much of a typical 12 hour day do you spend lying down? Less Than Half the Day   How much of a typical day do you spend walking/standing? Less Than Half the Day   How many hours (not including work) do you spend on the TV/Video Games/Computer/Tablet/Phone? 6 Hours or More   How many times a week are you active for the purpose of exercise? Never   What keeps you from being more active? Pain, Lack of Time, Too tired   How many total minutes do you spend doing some activity for the purpose of exercising when you exercise? None       PAST MEDICAL HISTORY:  Past Medical History:   Diagnosis Date     Diabetes mellitus type 2 in obese (H) 8/24/2017     ELIZABETH (obstructive sleep apnea)      Osteoarthritis of knee 6/4/2020       Work/Social History Reviewed With Patient 2/28/2021   My employment status is: Full-Time   My job is:    How much of your job is spent on the computer or phone? 75%   How many hours do you spend commuting to work daily?  20 min   What is your marital status? /In a Relationship   If in a relationship, is your significant other overweight? Yes   Do you have children? Yes   If you have children, are they overweight? No   Who do you live with?  Wife, 2 kids   Are they supportive of your health goals? Yes   Who does the food shopping?  Wife       Mental Health History Reviewed With Patient 2/28/2021   Have you ever been physically or sexually abused? No   If yes, do you feel that the abuse is affecting your weight? N/A   If yes, would you like to talk to a counselor about the  "abuse? N/A   How often in the past 2 weeks have you felt little interest or pleasure in doing things? Nearly Everyday   Over the past 2 weeks how often have you felt down, depressed, or hopeless? For Several Days       Sleep History Reviewed With Patient 2/28/2021   How many hours do you sleep at night? 7   Do you think that you snore loudly or has anybody ever heard you snore loudly (louder than talking or so loud it can be heard behind a shut door)? No   Has anyone seen or heard you stop breathing during your sleep? Yes   Do you often feel tired, fatigued, or sleepy during the day? Yes   Do you have a TV/Computer in your bedroom? No       MEDICATIONS:   Current Outpatient Medications   Medication Sig Dispense Refill     DULoxetine (CYMBALTA) 30 MG capsule Take 60 mg by mouth daily       EPINEPHrine (EPIPEN 2-PATRIA) 0.3 MG/0.3ML injection Inject 0.3 mLs (0.3 mg) into the muscle once as needed for anaphylaxis 1 each 0     lisinopril (ZESTRIL) 20 MG tablet Take 20 mg by mouth daily       metFORMIN (GLUCOPHAGE-XR) 500 MG 24 hr tablet metformin  mg tablet,extended release 24 hr   TAKE 1 TABLET BY MOUTH TWO TIMES A DAY WITH MEALS.       metoprolol tartrate (LOPRESSOR) 25 MG tablet Take 25 mg by mouth daily       albuterol (PROAIR HFA/PROVENTIL HFA/VENTOLIN HFA) 108 (90 Base) MCG/ACT inhaler Inhale 1-2 puffs into the lungs       ibuprofen (ADVIL/MOTRIN) 200 MG tablet Take 800 mg by mouth         ALLERGIES:   Allergies   Allergen Reactions     Shellfish Allergy Nausea and Vomiting     Other reaction(s): Wheezing     Definity      Flushing      Propane Unknown     Flushing      Seafood Swelling     Only to shell fish       PHYSICAL EXAM:  Ht 1.905 m (6' 3\")   Wt (!) 177.8 kg (392 lb)   BMI 49.00 kg/m     A & O x 3  HEENT: NCAT, mucous membranes moist  Respirations unlabored  Location of obesity: Mixed Obesity    ASSESSMENT:  Mell is a patient with mature onset morbid obesity with significant element of " "familial/genetic influence and with current health consequences. He does need aggressive weight loss plan due to Type II Diabetes (dx 11/20), Heart Disease, High Blood Pressure, High Cholesterol, Sleep Apnea, Asthma, Osteoarthritis (s/p bilateral knee replacement).      Mell Lawson eats a high carb diet.    His problem is complicated by a hunger disorder    His ability to lose weight is impacted by lack of confidence.    PLAN:    Volumetrics eating plan  Meal planning    Diabetes   Ancillary testing:  N/A. Frequent home glucose monitoring with report to nurse as normal weight decreases.   Food Plan:  Low carbohydrate.  Activity Plan:  Activity journal.  Supplementary:  N/A.  Medication:  Start: Semaglutide. The patient will begin medication in pursuit of improved medical status as influenced by body weight. He will start semaglutide.  There is a mutual understanding of the goals and risks of this therapy. The patient is in agreement. He is educated on dosage regimen and possible side effects.    RTC:    12 weeks.  Video call duration: 30 minutes.  I explained the conditions and plans (more than 50% of time was counseling/coordination of weight management).    Sincerely,  Davdi Peñaloza MD    The patient was evaluated via a billable video visit and notified \"This visit will be via video call between you and your physician. If lab work is needed we can place an order and you can later stop by the lab. Video visits are billed at different rates depending on your insurance coverage.  Please reach out to your insurance provider with any questions. If the physician feels a video visit is not appropriate, you will not be charged for this service.\" Patient gave verbal consent for Video visit and would you like to obtain AVS by [employer].      "

## 2021-03-01 NOTE — PATIENT INSTRUCTIONS
"Albaro Johnson,    Follow-up with RD in one month.    Thank you,    Ivy Cain, RD, LD  If you would like to schedule or reschedule an appointment with the RD, please call [phone]    Nutrition Goals  1) Increase fluid intake, consume at least 64 oz/day (calorie-free beverages)   2) Plan meals ahead of time, especially for nights you know you are going to be busy.  3) Limit dining of lunch to 2 days per week.  4) Limit dining out at dinner to 3 times per week  5) Consider keeping a food journal, or keeping track of calories in an zoë like [employer] Pal or Lose It.    The Plate Method  [email or web address]://[email or web address]    Protein Sources for Weight Loss  [email or web address]     Carbohydrates  [email or web address]     Summary of Volumetrics Eating Plan  [email or web address]     Lean and Heart Healthy Cooking Methods:  [email or web address]    Tips for a Low Sodium Diet  http://www.[email or web address]    Diabetes Recipe Resources:  Https://www.diabetesfoodhub.org/  https://www.cdc.gov/diabetes/library/spotlights/hack-your-snack.html  https://www.eatright.org/food/nutrition/dietary-guidelines-and-myplate/how-to-add-whole-grains-to-your-diet  https://diabeticgourmet.com/diabetic-recipe/turkey-veggie-snacks    Healthy Recipe Resources:  \"The Volumetrics Eating Plan\" by Nayla Shaver, Ph.D.  https://www.Voltea.[facility] [facility]/  www.Miroi.[employer]  www.oldInSequent.org  Dash Diet Recipes [facility]  www.extension.[facility] - the recipe box  \"Cooking that Counts\" by editors of CookingLight  https://www.[facility]/communityculinary/[facility]_Cookbook_KT_Final_11-6_NoCrops-compressed.pdf  Https://www.choosemyplate.gov/myplatekitchen  https://snaped.fns.usda.gov/recipes-menus - SNAP recipes      Interested in working with a health ?  Health coaches work with you to improve your overall health and wellbeing.  They look at the whole person, and may involve discussion of different areas of life, " including, but not limited to the four pillars of health (sleep, exercise, nutrition, and stress management). Discuss with your care team if you would like to start working a health .    Health Coaching-3 Pack:    $99 for three health coaching visits    Visits may be done in person or via phone    Coaching is a partnership between the  and the client; Coaches do not prescribe or diagnose    Coaching helps inspire the client to reach his/her personal goals      Virtual Support Groups are Available             Healthy Lifestyle Support Group      All are welcome!     Facilitator: Kayy Diggs, Certified Health     - Meets once a month on a Friday from 12:30pm to 1:30pm.  - Due to Covid-19 she is doing this Support Group virtually using Microsoft Teams.  - 60 minutes of small group guided discussion, support and resources.  - Please email Kayy directly at [email or web address] to receive monthly invitations.  - If you opt to participate in individual health coaching sessions or for general questions, contact Kayy via email.  - Kayy will send out invites for each session, so the phone number and the conference ID may change for each session.     2020 Meetings      December 18 - Open Forum      2021 Meetings      January 29 - How to Stay Active and Healthy during the Winter Months   February 26  - Reading Food Labels: What do I Need to Know?, Guest Speaker: Ivy Cain RD   March 19  - Finding Health, Happiness and Confidence at Every Size; Guest Speaker, Health Psychologist Fellow  April 30 -  Healthy Eating on a Budget, Guest Speaker: Belgica Bergeron RD   May 21 - Open Forum

## 2021-03-03 ENCOUNTER — TELEPHONE (OUTPATIENT)
Dept: ENDOCRINOLOGY | Facility: CLINIC | Age: 51
End: 2021-03-03

## 2021-03-03 NOTE — TELEPHONE ENCOUNTER
MTM referral from: [facility] visit (referral by provider)    MTM referral outreach attempt #2 on March 3, 2021 at 11:06 AM      Outcome: Patient not reachable after several attempts, will route to MTM Pharmacist/Provider as an FYI. Thank you for the referral.    Zack Ureña, MTM coordinator

## 2021-03-03 NOTE — TELEPHONE ENCOUNTER
LVM with call center number, pt to schedule video visits with Dr Peñaloza in 3 months around 6/1/21, and Dietician Ivy Cain in 4 weeks around 3/29/21.Sent [employer].

## 2021-04-04 ENCOUNTER — HEALTH MAINTENANCE LETTER (OUTPATIENT)
Age: 51
End: 2021-04-04

## 2021-07-11 ENCOUNTER — TELEPHONE (OUTPATIENT)
Dept: ENDOCRINOLOGY | Facility: CLINIC | Age: 51
End: 2021-07-11

## 2021-07-24 ENCOUNTER — HEALTH MAINTENANCE LETTER (OUTPATIENT)
Age: 51
End: 2021-07-24

## 2021-09-02 ENCOUNTER — HOSPITAL ENCOUNTER (EMERGENCY)
Facility: CLINIC | Age: 51
Discharge: HOME | End: 2021-09-02
Attending: FAMILY MEDICINE | Admitting: FAMILY MEDICINE
Payer: COMMERCIAL

## 2021-09-02 ENCOUNTER — APPOINTMENT (OUTPATIENT)
Dept: GENERAL RADIOLOGY | Facility: CLINIC | Age: 51
End: 2021-09-02
Attending: FAMILY MEDICINE
Payer: COMMERCIAL

## 2021-09-02 VITALS
SYSTOLIC BLOOD PRESSURE: 154 MMHG | TEMPERATURE: 98.2 F | WEIGHT: 315 LBS | DIASTOLIC BLOOD PRESSURE: 92 MMHG | OXYGEN SATURATION: 96 % | HEART RATE: 71 BPM | BODY MASS INDEX: 47.87 KG/M2 | RESPIRATION RATE: 25 BRPM

## 2021-09-02 DIAGNOSIS — K44.9 HIATAL HERNIA: ICD-10-CM

## 2021-09-02 PROCEDURE — 93005 ELECTROCARDIOGRAM TRACING: CPT | Performed by: FAMILY MEDICINE

## 2021-09-02 PROCEDURE — 93010 ELECTROCARDIOGRAM REPORT: CPT | Performed by: FAMILY MEDICINE

## 2021-09-02 PROCEDURE — 71046 X-RAY EXAM CHEST 2 VIEWS: CPT

## 2021-09-02 PROCEDURE — 99284 EMERGENCY DEPT VISIT MOD MDM: CPT | Mod: 25 | Performed by: FAMILY MEDICINE

## 2021-09-02 NOTE — ED TRIAGE NOTES
Pt also states that his left ear has been ringing, maybe a month.  Bilateral at times, worse on the left.

## 2021-09-02 NOTE — ED PROVIDER NOTES
History     Chief Complaint   Patient presents with     Chest Pain     HPI  Mell Lawson is a 51 year old male who presents to the ED tonight with a bubbling sensation in his left lower chest.  Started around 1130 or 12 PM.  Last maybe 5 or 10 seconds at a time.  It does not hurt.  He is not short of breath.  This is different than the fluttering type feeling that he has had in the past with atrial fibrillation.  He is on metoprolol now and that has worked well.  He denies any abdominal pain.  No nausea or vomiting.    Hands and feet sometimes go numb.  He does have a bad back and has attributed his foot numbness to that.  Plans on seeing his primary physician and skin doctor in the near future.  Here with his wife, Yani.    Allergies:  Allergies   Allergen Reactions     Shellfish Allergy Nausea and Vomiting     Other reaction(s): Wheezing     Definity      Flushing      Propane Unknown     Flushing      Seafood Swelling     Only to shell fish       Problem List:    Patient Active Problem List    Diagnosis Date Noted     Pain of left lower leg 01/14/2021     Priority: Medium     Epidural lipomatosis 10/01/2020     Priority: Medium     Arthritis of left knee 07/15/2020     Priority: Medium     Mild persistent asthma without complication 07/09/2020     Priority: Medium     Chronic obstructive pulmonary disease (H) 06/04/2020     Priority: Medium     Migraine headache 06/04/2020     Priority: Medium     Osteoarthritis of knee 06/04/2020     Priority: Medium     Lumbosacral radiculitis 05/08/2018     Priority: Medium     Mechanical low back pain 05/08/2018     Priority: Medium     Herniated lumbar intervertebral disc 05/08/2018     Priority: Medium     Acute left lumbar radiculopathy 04/29/2018     Priority: Medium     Anxiety 01/30/2018     Priority: Medium     Diabetes mellitus type 2 in obese (H) 08/24/2017     Priority: Medium     Family history of diabetes mellitus 11/03/2016     Priority: Medium      Prediabetes 2016     Priority: Medium     Arthritis of knee, right 2013     Priority: Medium     Hyperlipidemia LDL goal <160 2012     Priority: Medium     Elevated blood pressure reading without diagnosis of hypertension 2012     Priority: Medium     Polyarthritis 2012     Priority: Medium     Muscle pain 2012     Priority: Medium     (Problem list name updated by automated process. Provider to review and confirm.)       Back stiffness 2012     Priority: Medium     Neck stiffness 2012     Priority: Medium     Morbid obesity (H) 2010     Priority: Medium     Obstructive sleep apnea syndrome 2010     Priority: Medium     Overview:   On CPAP       Primary osteoarthritis of both knees 2010     Priority: Medium     Overview:   bilateral          Past Medical History:    Past Medical History:   Diagnosis Date     Diabetes mellitus type 2 in obese (H) 2017     ELIZABETH (obstructive sleep apnea)      Osteoarthritis of knee 2020       Past Surgical History:    Past Surgical History:   Procedure Laterality Date     ARTHROSCOPY KNEE WITH MENISCAL REPAIR      Right knee, multiple other previous surgeries as well.     HERNIORRHAPHY UMBILICAL N/A 2015    Procedure: HERNIORRHAPHY UMBILICAL;  Surgeon: Maxime Boyce MD;  Location:  OR       Family History:    Family History   Problem Relation Age of Onset     Diabetes Father         house first kidney transplant     Alzheimer Disease Maternal Grandmother      Arthritis Maternal Grandfather      Diabetes Paternal Grandfather      C.A.D. Paternal Grandfather        Social History:  Marital Status:   [2]  Social History     Tobacco Use     Smoking status: Former Smoker     Packs/day: 1.00     Quit date: 2013     Years since quittin.2     Smokeless tobacco: Never Used   Substance Use Topics     Alcohol use: Yes     Comment: occ     Drug use: No        Medications:    omeprazole (PRILOSEC) 20  "MG DR capsule  albuterol (PROAIR HFA/PROVENTIL HFA/VENTOLIN HFA) 108 (90 Base) MCG/ACT inhaler  DULoxetine (CYMBALTA) 30 MG capsule  EPINEPHrine (EPIPEN 2-PATRIA) 0.3 MG/0.3ML injection  ibuprofen (ADVIL/MOTRIN) 200 MG tablet  insulin pen needle (B-D U/F) 31G X 8 MM miscellaneous  lisinopril (ZESTRIL) 20 MG tablet  metFORMIN (GLUCOPHAGE-XR) 500 MG 24 hr tablet  metoprolol tartrate (LOPRESSOR) 25 MG tablet  Semaglutide,0.25 or 0.5MG/DOS, 2 MG/1.5ML SOPN          Review of Systems   All other systems reviewed and are negative.      Physical Exam   BP: (!) 151/93  Pulse: 70  Temp: 98.2  F (36.8  C)  Resp: 18  Weight: (!) 173.7 kg (383 lb)  SpO2: 99 %      Physical Exam  Constitutional:       General: He is not in acute distress.     Appearance: He is well-developed. He is obese.   Cardiovascular:      Rate and Rhythm: Normal rate and regular rhythm.   Pulmonary:      Effort: Pulmonary effort is normal.      Breath sounds: Normal breath sounds.   Abdominal:      Palpations: Abdomen is soft.      Tenderness: There is no abdominal tenderness.      Comments: When asked rotating over the left lower chest, I can hear bowel sounds at the same time that he feels the \"bubbling\" sensation.  Suspect he may have a hiatal hernia.   Neurological:      Mental Status: He is alert and oriented to person, place, and time.   Psychiatric:         Mood and Affect: Mood normal.         ED Course  (with Medical Decision Making)      51-year-old male with a \"bubbling\" sensation in his left lower chest that last for maybe 5-10 seconds at a time.  It happened while I was asked rotating his left lower chest.  He indicated that it was happening and I could hear bowel sounds that corresponded to this bubbling sensation.  Suspect he has hiatal hernia.  He has no chest pain or pressure.  His EKG is sinus rhythm at 68 bpm.  No acute ischemic changes.    2 view chest x-ray no infiltrates.  Does have a air-filled hiatal hernia which is consistent with his " "history and exam.  Now he recalls having more heartburn the last couple of weeks.  He has taken Tums with some relief.  I suggested Prilosec 20 mg daily and recheck in clinic with his primary physician if persistent problems.  Verbal and written discharge instructions given.  He and his wife are comfortable with this plan.            Procedures              EKG Interpretation:      Interpreted by Jarod Powell MD  Time reviewed: 0211  Symptoms at time of EKG: bubbling in left lower chest   Rhythm: normal sinus   Rate: normal  Axis: normal  Ectopy: none  Conduction: normal  ST Segments/ T Waves: No ST-T wave changes  Q Waves: none  Comparison to prior: Unchanged from 5/4/2017    Clinical Impression: normal EKG          Critical Care time:  none               Results for orders placed or performed during the hospital encounter of 09/02/21 (from the past 24 hour(s))   XR Chest 2 Views    Narrative    EXAM: XR CHEST 2 VW  LOCATION: [facility]  DATE/TIME: 9/2/2021 2:26 AM    INDICATION: feels \"bubbling\" in left lower chest intermittently, I can hear bowel sounds in that area when he feels it.  Suspect hiatal hernia  COMPARISON: 05/03/2017.      Impression    IMPRESSION: Heart size is normal. Clear lungs. Somewhat conspicuous lucency in the retrocardiac region on the lateral image could reflect a small gas containing hiatal hernia. No cross-sectional comparison imaging currently available to confirm. No   visible pneumothorax or pleural effusion.       Medications - No data to display    Assessments & Plan     I have reviewed the nursing notes.    I have reviewed the findings, diagnosis, plan and need for follow up with the patient.          New Prescriptions    OMEPRAZOLE (PRILOSEC) 20 MG DR CAPSULE    Take 1 capsule (20 mg) by mouth daily       Final diagnoses:   Hiatal hernia       9/2/2021   [facility] EMERGENCY DEPT     Jarod Powlel MD  09/02/21 " [id number]

## 2021-09-02 NOTE — ED TRIAGE NOTES
"Pt presents with concerns of \"chest bubbling\".   Pt states that it started at 2330, dizziness started once he arrived.   "

## 2021-09-02 NOTE — DISCHARGE INSTRUCTIONS
You can take the Prilosec 20 mg daily.    Liquid antacids can give more rapid relief but the Prilosec will help decrease the severity of the acid over time.    Recheck in clinic with your primary physician if persistent problems.    It was nice visiting with both of you tonight.  I hope this settles down quickly for you.    Thank you for choosing [facility]. We appreciate the opportunity to meet your urgent medical needs. Please let us know if we could have done anything to make your stay more satisfying.    After discharge, please closely monitor for any new or worsening symptoms. Return to the Emergency Department if you develop any acute worsening signs or symptoms.    If you had lab work, cultures or imaging studies done during your stay, the final results may still be pending. We will call you if your plan of care needs to change. However, if you are not improving as expected, please follow up with your primary care provider or clinic.     Start any prescription medications that were prescribed to you and take them as directed.     Please see additional handouts that may be pertinent to your condition.

## 2021-09-18 ENCOUNTER — HEALTH MAINTENANCE LETTER (OUTPATIENT)
Age: 51
End: 2021-09-18

## 2021-11-13 ENCOUNTER — HEALTH MAINTENANCE LETTER (OUTPATIENT)
Age: 51
End: 2021-11-13

## 2022-03-05 ENCOUNTER — HEALTH MAINTENANCE LETTER (OUTPATIENT)
Age: 52
End: 2022-03-05

## 2022-04-30 ENCOUNTER — HEALTH MAINTENANCE LETTER (OUTPATIENT)
Age: 52
End: 2022-04-30

## 2022-06-25 ENCOUNTER — HEALTH MAINTENANCE LETTER (OUTPATIENT)
Age: 52
End: 2022-06-25

## 2022-11-20 ENCOUNTER — HEALTH MAINTENANCE LETTER (OUTPATIENT)
Age: 52
End: 2022-11-20

## 2023-04-15 ENCOUNTER — HEALTH MAINTENANCE LETTER (OUTPATIENT)
Age: 53
End: 2023-04-15

## 2023-06-01 ENCOUNTER — HEALTH MAINTENANCE LETTER (OUTPATIENT)
Age: 53
End: 2023-06-01

## 2023-09-10 ENCOUNTER — HEALTH MAINTENANCE LETTER (OUTPATIENT)
Age: 53
End: 2023-09-10

## 2023-10-27 NOTE — ED AVS SNAPSHOT
[facility] Emergency Dept  [address] [place] [address]  Phone: [phone]  Fax: [phone]                                    Mell Lawson   MRN: [id number]    Department: [facility] Emergency Dept   Date of Visit: 10/20/2020           After Visit Summary Signature Page    I have received my discharge instructions, and my questions have been answered. I have discussed any challenges I see with this plan with the nurse or doctor.    ..........................................................................................................................................  Patient/Patient Representative Signature      ..........................................................................................................................................  Patient Representative Print Name and Relationship to Patient    ..................................................               ................................................  Date                                   Time    ..........................................................................................................................................  Reviewed by Signature/Title    ...................................................              ..............................................  Date                                               Time          22EPIC Rev 08/18        Lynsey Gambino BSn RN Case manager

## 2024-01-28 ENCOUNTER — HEALTH MAINTENANCE LETTER (OUTPATIENT)
Age: 54
End: 2024-01-28

## 2024-06-16 ENCOUNTER — HEALTH MAINTENANCE LETTER (OUTPATIENT)
Age: 54
End: 2024-06-16